# Patient Record
Sex: FEMALE | Race: WHITE | URBAN - METROPOLITAN AREA
[De-identification: names, ages, dates, MRNs, and addresses within clinical notes are randomized per-mention and may not be internally consistent; named-entity substitution may affect disease eponyms.]

---

## 2020-06-04 ENCOUNTER — APPOINTMENT (OUTPATIENT)
Dept: URBAN - METROPOLITAN AREA SURGERY 18 | Age: 45
Setting detail: DERMATOLOGY
End: 2020-06-04

## 2020-06-04 VITALS — DIASTOLIC BLOOD PRESSURE: 68 MMHG | SYSTOLIC BLOOD PRESSURE: 109 MMHG | HEART RATE: 68 BPM | RESPIRATION RATE: 12 BRPM

## 2020-06-04 PROBLEM — D03.21 MELANOMA IN SITU OF RIGHT EAR AND EXTERNAL AURICULAR CANAL: Status: ACTIVE | Noted: 2020-06-04

## 2020-06-04 PROCEDURE — 17311 MOHS 1 STAGE H/N/HF/G: CPT

## 2020-06-04 PROCEDURE — 88342 IMHCHEM/IMCYTCHM 1ST ANTB: CPT | Mod: 59

## 2020-06-04 PROCEDURE — OTHER REFERRAL CORRESPONDENCE: OTHER

## 2020-06-04 PROCEDURE — 15260 FTH/GFT FR N/E/E/L 20 SQCM/<: CPT

## 2020-06-04 PROCEDURE — OTHER MOHS SURGERY WITH DEBULK SPECIMEN: OTHER

## 2020-06-04 PROCEDURE — OTHER COUNSELING: OTHER

## 2020-06-04 NOTE — PROCEDURE: MOHS SURGERY WITH DEBULK SPECIMEN
Melanoma In Situ Histology Text: One or more of the following is visualized: severe confluence of melanocytes, nests of melanocytes within the epidermis, pagetoid spread of melanocytes, adnexal spread/extension, and/or large atypical melanocytes

## 2020-06-04 NOTE — PROCEDURE: MOHS SURGERY WITH DEBULK SPECIMEN
Area Suspicious For Tumor Histology Text: One or more areas suspicious for tumor were present in the sections examined.

## 2020-06-04 NOTE — PROCEDURE: MOHS SURGERY WITH DEBULK SPECIMEN
Follicular Atypia Histology Text: One or more follicles show variable atypia in the sections examined.

## 2020-06-04 NOTE — PROCEDURE: MOHS SURGERY WITH DEBULK SPECIMEN
Lentigo Maligna Histology Text: One or more of the following is visualized: predominance of individual melanocytes (over nests of melanocytes), severe confluence of melanocytes, and/or adnexal extension. Histologic signs of chronic ultraviolet exposure are also present.

## 2020-06-04 NOTE — PROCEDURE: MOHS SURGERY WITH DEBULK SPECIMEN
Incidental Squamous Cell Carcinoma In Situ Histology Text: In addition to the primary (preoperative) diagnosis, areas of full thickness epidermal keratinocyte atypia are seen along with focal areas of parakeratosis consistent with a diagnosis of squamous cell carcinoma in situ

## 2020-06-04 NOTE — PROCEDURE: MOHS SURGERY WITH DEBULK SPECIMEN
Superficial Spreading Melanoma Histology Text: One or more of the following is visualized: severe confluence of melanocytes, nests of melanocytes within the epidermis, pagetoid spread of melanocytes, adnexal spread/extension, and/or large atypical melanocytes. In cases of residual invasive melanoma these atypical melanocytes extend variably into the dermis as irregular nests and/or individual melanocytes.

## 2020-06-04 NOTE — PROCEDURE: MOHS SURGERY WITH DEBULK SPECIMEN
Lentigo Maligna Melanoma Histology Text: One or more of the following is visualized: severe confluence of melanocytes, nests of melanocytes within the epidermis, pagetoid spread of melanocytes, adnexal spread/extension, and/or large atypical melanocytes. Histologic signs of chronic ultraviolet exposure are also present. In cases of residual invasive melanoma these atypical melanocytes extend variably into the dermis as irregular nests and/or individual melanocytes.

## 2020-06-04 NOTE — PROCEDURE: MOHS SURGERY WITH DEBULK SPECIMEN
Bcc Histology Text: There were numerous aggregates of basaloid cells extending variably from the epidermis into the dermis. Varying amounts of myxoid stroma invest the tumor with focal areas of tumor-stroma retraction.

## 2020-06-04 NOTE — PROCEDURE: MOHS SURGERY WITH DEBULK SPECIMEN
Mohs Method Verbiage: The debulking was placed in formalin and sent for permanent section processing, evaluation, and restaging as necessary.  An approximate 3 mm width incision was then made (modified as necessary to suit functional/anatomic considerations) circumferentially around the debulking following the modified Mohs approach for melanocytic lesions, harvesting the tissue as a contiguous microscopic controlled layer. For invasive melanoma the total margin (debulking and first Mohs stage) taken during the first stage was at least 1 cm.

## 2020-06-04 NOTE — PROCEDURE: MOHS SURGERY WITH DEBULK SPECIMEN
Incidental Superficial Basal Cell Carcinoma Histology Text: In addition to the primary (preoperative) diagnosis, areas of basaloid tumor extending from the epidermis in a \"budding\" pattern are visualized with variable peripheral palisading of basaloid nuclei and stromal retraction consistent with a diagnosis of superficial basal cell carcinoma.

## 2020-06-04 NOTE — PROCEDURE: MOHS SURGERY WITH DEBULK SPECIMEN
Incidental Actinic Keratosis Histology Text: In addition to the primary (preoperative) diagnosis, areas of keratinocyte atypia are visualized at varying levels of the epidermis but the atypia does not consistently extend to the full length of the epidermis and does not involve the adjacent follicular units. This is consistent with a diagnosis of actinic keratosis.

## 2020-06-04 NOTE — PROCEDURE: MOHS SURGERY WITH DEBULK SPECIMEN
Atypical Junctional Melanocytic Proliferation, Favoring Melanoma Histology Text: One or more of the following is visualized: variable confluence of melanocytes, nests of melanocytes within the epidermis, focal pagetoid spread of melanocytes, adnexal spread/extension, and/or focal large atypical melanocytes.

## 2020-06-11 ENCOUNTER — APPOINTMENT (OUTPATIENT)
Dept: URBAN - METROPOLITAN AREA SURGERY 18 | Age: 45
Setting detail: DERMATOLOGY
End: 2020-06-14

## 2020-06-11 DIAGNOSIS — Z48.817 ENCOUNTER FOR SURGICAL AFTERCARE FOLLOWING SURGERY ON THE SKIN AND SUBCUTANEOUS TISSUE: ICD-10-CM

## 2020-06-11 PROCEDURE — 99024 POSTOP FOLLOW-UP VISIT: CPT

## 2020-06-11 PROCEDURE — OTHER POST-OP WOUND CHECK: OTHER

## 2020-06-11 ASSESSMENT — LOCATION SIMPLE DESCRIPTION DERM: LOCATION SIMPLE: RIGHT EAR

## 2020-06-11 ASSESSMENT — LOCATION DETAILED DESCRIPTION DERM: LOCATION DETAILED: RIGHT SUPERIOR HELIX

## 2020-06-11 ASSESSMENT — LOCATION ZONE DERM: LOCATION ZONE: EAR

## 2020-06-11 NOTE — PROCEDURE: POST-OP WOUND CHECK
Wound Evaluated By: Dr. Luis Armando Lee
Add 03157 Cpt? (Important Note: In 2017 The Use Of 64158 Is Being Tracked By Cms To Determine Future Global Period Reimbursement For Global Periods): yes
Detail Level: Detailed
Additional Comments: Graft appears vascularized, well contoured. No signs of impending necrosis/ulceration or failure. No signs/symptoms of infection at donor or recipient site. Dressing applied today to remain in place for one week. Follow up one week.

## 2020-06-18 ENCOUNTER — APPOINTMENT (OUTPATIENT)
Dept: URBAN - METROPOLITAN AREA SURGERY 18 | Age: 45
Setting detail: DERMATOLOGY
End: 2020-06-18

## 2020-06-18 DIAGNOSIS — Z48.817 ENCOUNTER FOR SURGICAL AFTERCARE FOLLOWING SURGERY ON THE SKIN AND SUBCUTANEOUS TISSUE: ICD-10-CM

## 2020-06-18 PROCEDURE — OTHER POST-OP WOUND CHECK: OTHER

## 2020-06-18 PROCEDURE — 99024 POSTOP FOLLOW-UP VISIT: CPT

## 2020-06-18 ASSESSMENT — LOCATION ZONE DERM: LOCATION ZONE: EAR

## 2020-06-18 ASSESSMENT — LOCATION SIMPLE DESCRIPTION DERM: LOCATION SIMPLE: RIGHT EAR

## 2020-06-18 ASSESSMENT — LOCATION DETAILED DESCRIPTION DERM: LOCATION DETAILED: RIGHT SUPERIOR HELIX

## 2020-06-18 NOTE — PROCEDURE: POST-OP WOUND CHECK
Add 05533 Cpt? (Important Note: In 2017 The Use Of 31439 Is Being Tracked By Cms To Determine Future Global Period Reimbursement For Global Periods): yes
Additional Comments: Graft appears well vascularized. May start to get wet daily. Continue daily dressing changes until fully healed.\\n\\nDr. Rosa's note - seen/agree. Well vascularized and contoured graft. Expected appearance as graft matures discussed. Continued care, including diligent photoprotection reviewed. Follow up with any concerns at surgical site.
Detail Level: Detailed
Wound Evaluated By: Dr. Luis Armando Lee

## 2022-09-26 NOTE — PROCEDURE: MOHS SURGERY WITH DEBULK SPECIMEN
